# Patient Record
Sex: MALE | Race: WHITE
[De-identification: names, ages, dates, MRNs, and addresses within clinical notes are randomized per-mention and may not be internally consistent; named-entity substitution may affect disease eponyms.]

---

## 2018-05-05 ENCOUNTER — HOSPITAL ENCOUNTER (EMERGENCY)
Dept: HOSPITAL 62 - ER | Age: 28
Discharge: LEFT BEFORE DISCHARGE | End: 2018-05-05
Payer: COMMERCIAL

## 2018-05-05 VITALS — SYSTOLIC BLOOD PRESSURE: 126 MMHG | DIASTOLIC BLOOD PRESSURE: 62 MMHG

## 2018-05-05 DIAGNOSIS — M79.671: ICD-10-CM

## 2018-05-05 DIAGNOSIS — F17.200: ICD-10-CM
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
## 2018-05-05 DIAGNOSIS — S92.414B: Primary | ICD-10-CM

## 2018-05-05 PROCEDURE — 90715 TDAP VACCINE 7 YRS/> IM: CPT

## 2018-05-05 PROCEDURE — 90471 IMMUNIZATION ADMIN: CPT

## 2018-05-05 PROCEDURE — 99283 EMERGENCY DEPT VISIT LOW MDM: CPT

## 2018-05-05 PROCEDURE — 96375 TX/PRO/DX INJ NEW DRUG ADDON: CPT

## 2018-05-05 PROCEDURE — 96365 THER/PROPH/DIAG IV INF INIT: CPT

## 2018-05-05 PROCEDURE — 73630 X-RAY EXAM OF FOOT: CPT

## 2018-05-05 NOTE — ER DOCUMENT REPORT
ED General





- General


Chief Complaint: Toe Injury


Stated Complaint: TOE INJURY


Time Seen by Provider: 05/05/18 15:27


Mode of Arrival: Ambulatory


Information source: Patient


TRAVEL OUTSIDE OF THE [place] IN LAST 30 DAYS: No





- HPI


Notes: 





28-year-old male presents to the emergency room today with complaints of right 

foot pain after he dropped a dresser on his big toe x 1 hour ago.  Reports pain 

is 6 out of 10, throbbing achy. worse with movement, better with elevation. no 

otc meds tried. tetanus is not utd.  Denies any numbness or tingling to 

bilateral lower extremities equally.  Denies any other area of injury.  Unable 

to bear full weight.  Denies fevers, chills,  chest pain,palpitations,  

shortness of breath, dyspnea, nausea, vomiting, diarrhea, abdominal pain, 

hematuria,blurred vision, double vision, loss of vision, speech changes, LH, 

dizziness, syncope, headaches, wheezing, ST, URI, neck pain, weakness, bowel or 

bladder dysfunction, saddle anesthesia, numbness or tingling in bilateral upper 

or lower extremities equally, muscle paralysis, weakness in bilateral upper or 

lower extremities equally or rash. Denies IV drug use.





- Related Data


Allergies/Adverse Reactions: 


 





No Known Allergies Allergy (Unverified 05/05/18 15:12)


 











Past Medical History





- General


Information source: Patient





- Social History


Smoking Status: Current Every Day Smoker


Family History: Reviewed & Not Pertinent





Review of Systems





- Review of Systems


Constitutional: No symptoms reported


EENT: No symptoms reported


Cardiovascular: No symptoms reported


Respiratory: No symptoms reported


Gastrointestinal: No symptoms reported


Genitourinary: No symptoms reported


Male Genitourinary: No symptoms reported


Musculoskeletal: See HPI


Skin: No symptoms reported


Hematologic/Lymphatic: No symptoms reported


Neurological/Psychological: No symptoms reported





Physical Exam





- Vital signs


Vitals: 


 











Temp Pulse Resp BP Pulse Ox


 


 98.9 F   84   16   126/62 H  98 


 


 05/05/18 15:21  05/05/18 15:21  05/05/18 15:21  05/05/18 15:21  05/05/18 15:21














- Notes


Notes: 





PHYSICAL EXAMINATION:





GENERAL: Well-appearing, well-nourished and in no acute distress.





HEAD: Atraumatic, normocephalic.





EYES: Pupils equal round and reactive to light, extraocular movements intact, 

sclera anicteric, conjunctiva are normal.





ENT: Nares patent, oropharynx clear without exudates.  Moist mucous membranes.





NECK: Normal range of motion, supple without lymphadenopathy





LUNGS: Breath sounds clear to auscultation bilaterally and equal.  No wheezes 

rales or rhonchi.





HEART: Regular rate and rhythm without murmurs





ABDOMEN: Soft, nontender, nondistended abdomen.  No guarding, no rebound.  No 

masses appreciated.





Musculoskeletal: Normal range of motion, no pitting or edema.  No cyanosis.





NEUROLOGICAL: Cranial nerves grossly intact.  Normal speech, normal gait.  

Normal sensory, motor exams. Right great toe  STS and tenderness on DIP with 

bleeding around nail with palpation. no open laceration.  Unable to palpate a 

step-off. No open lesions. squeeze test negative. dtr +2 BLE. Limited APROM.   

distal pulses + 2 in BUE. full motor and sensory function. No  vascular 

compromise. Ankle exam within normal limits.


 


PSYCH: Normal mood, normal affect.





SKIN: Warm, Dry, normal turgor, no rashes or lesions noted.





Course





- Re-evaluation


Re-evalutation: 








28-year-old male presents today with complaints of right toe pain after dresser 

fell on his foot, noted bleeding.  Tetanus is not up-to-date.  X-ray right foot 

shows a fracture to the right tuff first distal phalanx.  100 mg of clindamycin 

IV due to having an open fracture as well as his tetanus being updated.  Wound 

irrigated with high-pressure normal saline, no foreign body seen on expiration 

of the wound.  Patient will be discharged with crutches and a postop shoe.  

Patient states his pain is under control after given IVP Toradol.  I have 

reevaluated this patient multiple times and no significant life threatening 

changes, no signs of toxicity, sepsis or peritonitis are noted. The patient  

and I have discussed the diagnosis and risks, and we agree with discharging 

home and close follow-up. We also discussed returning to the Emergency 

Department immediately if new or worsening symptoms occur with the 

understanding that symptoms and presentations can change. At this time will 

discharge with return precautions and follow-up recommendations.  Verbal 

discharge instructions given a the bedside and opportunity for questions given. 

We have discussed the symptoms which are most concerning (e.g., saddle 

anesthesia, urinary or bowel incontinence or retention, changing or worsening 

pain) that necessitate immediate return. Medication warnings reviewed. Patient 

is in agreement with this plan and has verbalized understanding of return 

precautions and the need for primary care follow-up in the next 24-72 hours.  

Patient verbalized understanding of plan of care and agree with plan of care.

















- Vital Signs


Vital signs: 


 











Temp Pulse Resp BP Pulse Ox


 


 98.9 F   84   16   126/62 H  98 


 


 05/05/18 15:21  05/05/18 15:21  05/05/18 15:21  05/05/18 15:21  05/05/18 15:21














Discharge





- Discharge


Clinical Impression: 


Toe fracture, right


Qualifiers:


 Encounter type: initial encounter Toe: great toe Fracture type: open Phalanx: 

proximal Fracture alignment: nondisplaced Qualified Code(s): S92.414B - 

Nondisplaced fracture of proximal phalanx of right great toe, initial encounter 

for open fracture





Condition: Good


Disposition: HOME, SELF-CARE


Instructions:  Clindamycin ([facility]), Use of Crutches ([facility]), Post-Op Shoe ([facility]), 

Tetanus Immunization Given ([facility]), Fractured Toe ([facility])


Additional Instructions: 


Open toe Tuft Fracture





     The tip of your toe is broken.  As the fracture occurred, it burst the 

skin open.  Usually the toenail l is knocked loose in this type of injury.  The 

cut should heal in about ten days, and the bone will be healed in three or four 

weeks.  A new toenail will take about three months to grow out.


     The fracture will be protected with a splint or a very bulky wrap.  

Elevating the finger will help reduce pain and swelling.  Cold packs can help.  

Keep the wound dry.  Don't expose the finger to damaging chemicals.  Redress 

the wound daily.


     You'll need to continue with a protective splint after the cut has healed.

  When you can push firmly on the tip of your finger without any pain, you don'

t need the splint.


     If any signs of infection occur (swelling, redness, increasing tenderness, 

red streaks, tender lumps in the armpit, or fever), see the doctor immediately.





Use crutches and postop she was directed.  Take ibuprofen as needed.  Rice 

therapy.  Follow-up with orthopedic specialist within 1 week.  Return 

immediately for any new or worsening symptoms.





Follow up with primary care provider, call tomorrow to make followup 

appointment.


Prescriptions: 


Clindamycin HCl 300 mg PO Q6H #28 capsule


Ibuprofen 600 mg PO QIDP PRN #20 tablet


 PRN Reason: 


Forms:  Return to Work


Referrals: 


JARRED OLEARY MD [ACTIVE STAFF] - Follow up in 1 week


FENG OWUSU MD [ACTIVE STAFF] - Follow up in 3-5 days

## 2018-05-05 NOTE — RADIOLOGY REPORT (SQ)
EXAM DESCRIPTION:  FOOT RIGHT COMPLETE



COMPLETED DATE/TIME:  5/5/2018 3:53 pm



REASON FOR STUDY:  dropped heavy object on right foot



COMPARISON:  None.



NUMBER OF VIEWS:  Three views.



TECHNIQUE:  AP, lateral and oblique  radiographic images acquired of the right foot.



LIMITATIONS:  None.



FINDINGS:  MINERALIZATION: Normal.

BONES: Mildly displaced fracture through the tuft of the 1st distal phalanx.  Bones otherwise intact.


JOINTS: No effusions.

SOFT TISSUES: Associated soft tissue swelling.

OTHER: No other significant finding.



IMPRESSION:  TUFT FRACTURE DISTAL PHALANX 1ST TOE RIGHT FOOT.



TECHNICAL DOCUMENTATION:  JOB ID:  [id number]

 2011 [employer]- All Rights Reserved



Reading location - IP/workstation name: [email or web address]

## 2019-03-13 ENCOUNTER — HOSPITAL ENCOUNTER (EMERGENCY)
Dept: HOSPITAL 62 - ER | Age: 29
Discharge: HOME | End: 2019-03-13
Payer: COMMERCIAL

## 2019-03-13 VITALS — SYSTOLIC BLOOD PRESSURE: 105 MMHG | DIASTOLIC BLOOD PRESSURE: 60 MMHG

## 2019-03-13 DIAGNOSIS — N50.812: ICD-10-CM

## 2019-03-13 DIAGNOSIS — R11.2: ICD-10-CM

## 2019-03-13 DIAGNOSIS — N20.0: Primary | ICD-10-CM

## 2019-03-13 DIAGNOSIS — F17.200: ICD-10-CM

## 2019-03-13 DIAGNOSIS — R10.9: ICD-10-CM

## 2019-03-13 LAB
ADD MANUAL DIFF: NO
ALBUMIN SERPL-MCNC: 4.7 G/DL (ref 3.5–5)
ALP SERPL-CCNC: 61 U/L (ref 38–126)
ALT SERPL-CCNC: 26 U/L (ref 21–72)
ANION GAP SERPL CALC-SCNC: 11 MMOL/L (ref 5–19)
APPEARANCE UR: (no result)
APTT PPP: YELLOW S
AST SERPL-CCNC: 27 U/L (ref 17–59)
BASOPHILS # BLD AUTO: 0 10^3/UL (ref 0–0.2)
BASOPHILS NFR BLD AUTO: 0.3 % (ref 0–2)
BILIRUB DIRECT SERPL-MCNC: 0.1 MG/DL (ref 0–0.4)
BILIRUB SERPL-MCNC: 0.4 MG/DL (ref 0.2–1.3)
BILIRUB UR QL STRIP: NEGATIVE
BUN SERPL-MCNC: 12 MG/DL (ref 7–20)
CALCIUM: 9.6 MG/DL (ref 8.4–10.2)
CHLORIDE SERPL-SCNC: 104 MMOL/L (ref 98–107)
CO2 SERPL-SCNC: 24 MMOL/L (ref 22–30)
EOSINOPHIL # BLD AUTO: 0.1 10^3/UL (ref 0–0.6)
EOSINOPHIL NFR BLD AUTO: 1 % (ref 0–6)
ERYTHROCYTE [DISTWIDTH] IN BLOOD BY AUTOMATED COUNT: 13.4 % (ref 11.5–14)
GLUCOSE SERPL-MCNC: 97 MG/DL (ref 75–110)
GLUCOSE UR STRIP-MCNC: NEGATIVE MG/DL
HCT VFR BLD CALC: 39.5 % (ref 37.9–51)
HGB BLD-MCNC: 14.1 G/DL (ref 13.5–17)
KETONES UR STRIP-MCNC: 20 MG/DL
LIPASE SERPL-CCNC: 91.3 U/L (ref 23–300)
LYMPHOCYTES # BLD AUTO: 4.1 10^3/UL (ref 0.5–4.7)
LYMPHOCYTES NFR BLD AUTO: 34.7 % (ref 13–45)
MCH RBC QN AUTO: 31 PG (ref 27–33.4)
MCHC RBC AUTO-ENTMCNC: 35.7 G/DL (ref 32–36)
MCV RBC AUTO: 87 FL (ref 80–97)
MONOCYTES # BLD AUTO: 0.9 10^3/UL (ref 0.1–1.4)
MONOCYTES NFR BLD AUTO: 7.9 % (ref 3–13)
NEUTROPHILS # BLD AUTO: 6.6 10^3/UL (ref 1.7–8.2)
NEUTS SEG NFR BLD AUTO: 56.1 % (ref 42–78)
NITRITE UR QL STRIP: NEGATIVE
PH UR STRIP: 6 [PH] (ref 5–9)
PLATELET # BLD: 177 10^3/UL (ref 150–450)
POTASSIUM SERPL-SCNC: 4 MMOL/L (ref 3.6–5)
PROT SERPL-MCNC: 7.2 G/DL (ref 6.3–8.2)
PROT UR STRIP-MCNC: 30 MG/DL
RBC # BLD AUTO: 4.55 10^6/UL (ref 4.35–5.55)
SODIUM SERPL-SCNC: 138.9 MMOL/L (ref 137–145)
SP GR UR STRIP: 1.03
TOTAL CELLS COUNTED % (AUTO): 100 %
UROBILINOGEN UR-MCNC: 4 MG/DL (ref ?–2)
WBC # BLD AUTO: 11.7 10^3/UL (ref 4–10.5)

## 2019-03-13 PROCEDURE — 83690 ASSAY OF LIPASE: CPT

## 2019-03-13 PROCEDURE — 81001 URINALYSIS AUTO W/SCOPE: CPT

## 2019-03-13 PROCEDURE — 99284 EMERGENCY DEPT VISIT MOD MDM: CPT

## 2019-03-13 PROCEDURE — 96361 HYDRATE IV INFUSION ADD-ON: CPT

## 2019-03-13 PROCEDURE — 36415 COLL VENOUS BLD VENIPUNCTURE: CPT

## 2019-03-13 PROCEDURE — 96375 TX/PRO/DX INJ NEW DRUG ADDON: CPT

## 2019-03-13 PROCEDURE — 96374 THER/PROPH/DIAG INJ IV PUSH: CPT

## 2019-03-13 PROCEDURE — 80053 COMPREHEN METABOLIC PANEL: CPT

## 2019-03-13 PROCEDURE — 74176 CT ABD & PELVIS W/O CONTRAST: CPT

## 2019-03-13 PROCEDURE — 85025 COMPLETE CBC W/AUTO DIFF WBC: CPT

## 2019-03-13 NOTE — ER DOCUMENT REPORT
ED General





- General


Chief Complaint: Abdominal Pain


Stated Complaint: ABDOMINAL PAIN


Time Seen by Provider: 03/13/19 06:10


TRAVEL OUTSIDE OF THE [place] IN LAST 30 DAYS: No





- HPI


Patient complains to provider of: Abdominal pain testicle pain


Notes: 





Patient coming in for evaluation of abdominal pain testicle pain.  Patient 

states acute onset earlier this morning.  Patient is left-sided pain left flank 

pain along with left testicle pain.  Patient denies any trauma to the area.  P

atient denies any difficulty in urinating.  Patient states nausea vomiting no 

diarrhea.  Patient otherwise looks to be uncomfortable rocking back and forth.  

Patient states similar episode before came to the ER however was never diagnosed

with any acute pathology.  Patient looks to be no obvious distress respiratory 

upon my evaluation.





A brief review of the patient's past medical records available in [email or web address] was 

performed





- Related Data


Allergies/Adverse Reactions: 


                                        





No Known Allergies Allergy (Verified 03/13/19 07:20)


   











Past Medical History





- Social History


Smoking Status: Current Every Day Smoker


Family History: Reviewed & Not Pertinent


Patient has suicidal ideation: No


Patient has homicidal ideation: No


Renal/ Medical History: Denies: Hx Peritoneal Dialysis


Past Surgical History: Reports: Hx Tonsillectomy





Review of Systems





- Review of Systems


Constitutional: No symptoms reported


EENT: No symptoms reported


Cardiovascular: No symptoms reported


Respiratory: No symptoms reported


Gastrointestinal: Nausea, Vomiting


Genitourinary: Flank pain


Male Genitourinary: No symptoms reported


Musculoskeletal: No symptoms reported


Skin: No symptoms reported


Hematologic/Lymphatic: No symptoms reported


Neurological/Psychological: No symptoms reported


-: Yes All other systems reviewed and negative





Physical Exam





- Vital signs


Vitals: 


                                        











Temp Pulse Resp BP Pulse Ox


 


 98.2 F   95   30 H  128/80 H  99 


 


 03/13/19 06:01  03/13/19 06:01  03/13/19 06:01  03/13/19 06:01  03/13/19 06:01











Interpretation: Normal





- General


General appearance: Appears well, Alert





- HEENT


Head: Normocephalic, Atraumatic


Eyes: Normal


Pupils: PERRL





- Respiratory


Respiratory status: No respiratory distress


Chest status: Nontender


Breath sounds: Normal


Chest palpation: Normal





- Cardiovascular


Rhythm: Regular


Heart sounds: Normal auscultation


Murmur: No





- Abdominal


Inspection: Normal


Distension: No distension


Bowel sounds: Normal


Tenderness: Nontender


Organomegaly: No organomegaly





- Genitourinary


Inspection: Normal


Tenderness: Nontender


Cremasteric reflex: Normal


Scrotum: Normal





- Back


Back: Normal, Nontender





- Extremities


General upper extremity: Normal inspection, Nontender, Normal color, Normal ROM,

Normal temperature


General lower extremity: Normal inspection, Nontender, Normal color, Normal ROM,

Normal temperature, Normal weight bearing.  No: Emmanuel's sign





- Neurological


Neuro grossly intact: Yes


Cognition: Normal


Orientation: AAOx4


Adriane Coma Scale Eye Opening: Spontaneous


Adriane Coma Scale Verbal: Oriented


[place] Coma Scale Motor: Obeys Commands


Adriane Coma Scale Total: 15


Speech: Normal


Motor strength normal: LUE, RUE, LLE, RLE


Sensory: Normal





- Psychological


Associated symptoms: Normal affect, Normal mood





- Skin


Skin Temperature: Warm


Skin Moisture: Dry


Skin Color: Normal





Course





- Re-evaluation


Re-evalutation: 





03/13/19 08:02


Patient was to have a left 4 mm kidney stone causing no signs of renal failure 

or urosepsis.  Patient will be discharged home requesting no narcotics be given 

to him at this time.  We will get the patient Tylenol Motrin will give the 

patient Zofran and Phenergan for his nausea and vomiting.  We will also give the

patient a dose of Flomax.  Patient will be discharged home.





The patient presents with abdominal pain without signs of peritonitis or other 

life-threatening or serious etiology. The patient appears stable for discharge 

and has been instructed to return immediately if the symptoms worsen in any way,

or in 8-12hr if not improved for re-evaluation. The patient has been instructed 

to return if the symptoms worsen or change in any way.





- Vital Signs


Vital signs: 


                                        











Temp Pulse Resp BP Pulse Ox


 


 98.2 F   95   10 L  128/80 H  99 


 


 03/13/19 06:01  03/13/19 06:01  03/13/19 07:00  03/13/19 06:01  03/13/19 06:01














- Laboratory


Result Diagrams: 


                                 03/13/19 06:15





                                 03/13/19 06:15


Laboratory results interpreted by me: 


                                        











  03/13/19 03/13/19





  06:15 07:23


 


WBC  11.7 H 


 


Urine Protein   30 H


 


Urine Ketones   20 H


 


Urine Blood   LARGE H


 


Urine Urobilinogen   4.0 H














Discharge





- Discharge


Clinical Impression: 


 Kidney stone on left side





Condition: Good


Disposition: HOME, SELF-CARE


Instructions:  Flomax ([facility]), Kidney Stone ([facility])


Additional Instructions: 


Your evaluation today shows a left 4 mm kidney stone.  Kidney stone will pass on

its own is very important that you continue to drink plenty of fluids to stay 

well-hydrated.  Please take the Zofran or Phenergan as needed for nausea 

vomiting





You may take the Flomax this may aid in passing of your kidney stone.





Tylenol Motrin for pain control


Prescriptions: 


Ondansetron [Zofran Odt 4 mg Tablet] 4 mg PO Q4HP PRN #30 tab.rapdis


 PRN Reason: 


Ketorolac Tromethamine [Toradol 10 mg Tablet] 10 mg PO Q8HP PRN #30 tablet


 PRN Reason: 


Promethazine HCl [Phenergan 25 mg Tablet] 25 - 50 mg PO ASDIR PRN #30 tablet


 PRN Reason: 


Tamsulosin HCl [Flomax 0.4 mg Cap.sr] 0.4 mg PO DAILY #7 cap.sr.24h

## 2019-03-13 NOTE — RADIOLOGY REPORT (SQ)
EXAM DESCRIPTION: 



CT ABDOMEN PELVIS WITHOUT IV CONTRAST



COMPLETED DATE/TME:  03/13/2019 06:20



CLINICAL HISTORY: 



29 years, Male, left flank



COMPARISON:

None.



TECHNIQUE:

Axial CT images of the abdomen and pelvis were obtained without

contrast. Sagittal and coronal reformats were performed.  

Images stored on PACS.

 

All CT scanners at this facility use dose modulation, iterative

reconstruction, and/or weight based dosing when appropriate to

reduce radiation dose to as low as reasonably achievable (ALARA).





[facility]: Dose Right [facility]: CareDose   [facility]: Dose Right    [facility]:

Teradose 4D    [facility]: Smart Naartjie



LIMITATIONS:

None.



FINDINGS:



The lung bases are clear.

The liver, gallbladder, pancreas, spleen, and adrenal glands are

unremarkable.

There is no evidence of urolithiasis or hydronephrosis on the

right. There is a 4 mm stone near the left UVJ without

significant hydroureter or hydronephrosis.

There is no intraperitoneal free air or fluid. There is no

lymphadenopathy.

The stomach and small bowel appear unremarkable. The appendix is

not uniquely identified, however there are no pericecal

inflammatory changes. The colon is incompletely distended.

The prostate gland is unremarkable.

There are no lytic or blastic bone lesions





IMPRESSION:



4 mm stone near the left UVJ without significant hydroureter or

hydronephrosis.

 

TECHNICAL DOCUMENTATION:



Quality ID # [id number]: Final reports with documentation of one or more

dose reduction techniques (e.g., Automated exposure control,

adjustment of the mA and/or kV according to patient size, use of

iterative reconstruction technique)



copyright 2011 [employer]- All Rights Reserved